# Patient Record
Sex: MALE | Race: WHITE | HISPANIC OR LATINO
[De-identification: names, ages, dates, MRNs, and addresses within clinical notes are randomized per-mention and may not be internally consistent; named-entity substitution may affect disease eponyms.]

---

## 2024-03-08 ENCOUNTER — APPOINTMENT (OUTPATIENT)
Dept: ORTHOPEDIC SURGERY | Facility: CLINIC | Age: 59
End: 2024-03-08
Payer: COMMERCIAL

## 2024-03-08 DIAGNOSIS — S83.281A OTHER TEAR OF LATERAL MENISCUS, CURRENT INJURY, RIGHT KNEE, INITIAL ENCOUNTER: ICD-10-CM

## 2024-03-08 DIAGNOSIS — Z78.9 OTHER SPECIFIED HEALTH STATUS: ICD-10-CM

## 2024-03-08 PROCEDURE — 20610 DRAIN/INJ JOINT/BURSA W/O US: CPT | Mod: 50

## 2024-03-08 PROCEDURE — 99203 OFFICE O/P NEW LOW 30 MIN: CPT | Mod: 25

## 2024-03-08 PROCEDURE — 73562 X-RAY EXAM OF KNEE 3: CPT | Mod: 50

## 2024-03-12 PROBLEM — Z78.9 CONSUMES ALCOHOL OCCASIONALLY: Status: ACTIVE | Noted: 2024-03-08

## 2024-03-12 PROBLEM — Z78.9 EXERCISES DAILY: Status: ACTIVE | Noted: 2024-03-08

## 2024-03-22 ENCOUNTER — NON-APPOINTMENT (OUTPATIENT)
Age: 59
End: 2024-03-22

## 2024-03-29 ENCOUNTER — APPOINTMENT (OUTPATIENT)
Dept: FAMILY MEDICINE | Facility: CLINIC | Age: 59
End: 2024-03-29
Payer: COMMERCIAL

## 2024-03-29 ENCOUNTER — NON-APPOINTMENT (OUTPATIENT)
Age: 59
End: 2024-03-29

## 2024-03-29 VITALS
WEIGHT: 166 LBS | DIASTOLIC BLOOD PRESSURE: 87 MMHG | TEMPERATURE: 97.3 F | HEIGHT: 69.3 IN | SYSTOLIC BLOOD PRESSURE: 149 MMHG | HEART RATE: 63 BPM | BODY MASS INDEX: 24.31 KG/M2 | OXYGEN SATURATION: 98 %

## 2024-03-29 VITALS — DIASTOLIC BLOOD PRESSURE: 80 MMHG | SYSTOLIC BLOOD PRESSURE: 130 MMHG

## 2024-03-29 DIAGNOSIS — Z00.00 ENCOUNTER FOR GENERAL ADULT MEDICAL EXAMINATION W/OUT ABNORMAL FINDINGS: ICD-10-CM

## 2024-03-29 DIAGNOSIS — R79.89 OTHER SPECIFIED ABNORMAL FINDINGS OF BLOOD CHEMISTRY: ICD-10-CM

## 2024-03-29 DIAGNOSIS — G47.00 INSOMNIA, UNSPECIFIED: ICD-10-CM

## 2024-03-29 PROCEDURE — 99386 PREV VISIT NEW AGE 40-64: CPT | Mod: 25

## 2024-03-29 PROCEDURE — 93000 ELECTROCARDIOGRAM COMPLETE: CPT

## 2024-03-29 PROCEDURE — 36415 COLL VENOUS BLD VENIPUNCTURE: CPT

## 2024-03-29 RX ORDER — ZOLPIDEM TARTRATE 5 MG/1
5 TABLET ORAL
Qty: 30 | Refills: 0 | Status: ACTIVE | COMMUNITY
Start: 2024-03-29 | End: 1900-01-01

## 2024-03-29 NOTE — HISTORY OF PRESENT ILLNESS
[de-identified] : 57 yo male presents to establish care, CPE. Patient reports hx low B12 level, has been getting monthly B12 injections but stopped last few months. Patient also reports hx insomnia, takes Ambien 5mg PRN, states he rarely uses but requesting refill. Patient recently established care with ortho Dr. Almonte for bilateral knee pain, has appt for cortisone injections scheduled for Monday. Patient states he had a colonoscopy about 4 years ago, states it was normal and he was told to repeat in 8 years, due 2028.  Patient feeling well today. [FreeTextEntry1] : Establish care, CPE

## 2024-03-29 NOTE — HEALTH RISK ASSESSMENT
[Yes] : Yes [2 - 4 times a month (2 pts)] : 2-4 times a month (2 points) [Never (0 pts)] : Never (0 points) [1 or 2 (0 pts)] : 1 or 2 (0 points) [No] : In the past 12 months have you used drugs other than those required for medical reasons? No [No falls in past year] : Patient reported no falls in the past year [0] : 2) Feeling down, depressed, or hopeless: Not at all (0) [PHQ-2 Negative - No further assessment needed] : PHQ-2 Negative - No further assessment needed [Patient reported colonoscopy was normal] : Patient reported colonoscopy was normal [None] : None [Alone] : lives alone [Employed] : employed [Fully functional (bathing, dressing, toileting, transferring, walking, feeding)] : Fully functional (bathing, dressing, toileting, transferring, walking, feeding) [Fully functional (using the telephone, shopping, preparing meals, housekeeping, doing laundry, using] : Fully functional and needs no help or supervision to perform IADLs (using the telephone, shopping, preparing meals, housekeeping, doing laundry, using transportation, managing medications and managing finances) [Never] : Never [HIV test declined] : HIV test declined [Hepatitis C test declined] : Hepatitis C test declined [de-identified] : running daily [Audit-CScore] : 2 [de-identified] : vegetarian  [[id number]] : 0 [Reports changes in hearing] : Reports no changes in hearing [Change in mental status noted] : No change in mental status noted [Reports changes in dental health] : Reports no changes in dental health [Reports changes in vision] : Reports no changes in vision [ColonoscopyDate] : 01/20 [FreeTextEntry2] :  [de-identified] : tinnitus

## 2024-03-29 NOTE — PLAN
[FreeTextEntry1] : HCM -F/u bloodwork, call patient with results -Discussed PSA screening with patient, patient agreeable -Discussed EKG with patient, shows prolonged KY interval but otherwise NSR, no acute ST/T wave changes, patient denies any dizziness, lightheadedness, SOB, CP. Patient is very active and runs daily without symptoms, will monitor for now. Patient to return for further workup if above symptoms develop. Patient verbalizes understanding, agreeable with plan.  -Up to date with colonoscopy -Patient states he has received 2 doses of shingles vaccine in past, patient to forward vaccine records for our review -Declines STI screening today

## 2024-04-01 ENCOUNTER — APPOINTMENT (OUTPATIENT)
Dept: ORTHOPEDIC SURGERY | Facility: CLINIC | Age: 59
End: 2024-04-01
Payer: COMMERCIAL

## 2024-04-01 DIAGNOSIS — M17.0 BILATERAL PRIMARY OSTEOARTHRITIS OF KNEE: ICD-10-CM

## 2024-04-01 LAB
ALBUMIN SERPL ELPH-MCNC: 4.9 G/DL
ALP BLD-CCNC: 102 U/L
ALT SERPL-CCNC: 20 U/L
ANION GAP SERPL CALC-SCNC: 12 MMOL/L
AST SERPL-CCNC: 26 U/L
BASOPHILS # BLD AUTO: 0.04 K/UL
BASOPHILS NFR BLD AUTO: 0.9 %
BILIRUB SERPL-MCNC: 0.3 MG/DL
BUN SERPL-MCNC: 17 MG/DL
CALCIUM SERPL-MCNC: 9.5 MG/DL
CHLORIDE SERPL-SCNC: 104 MMOL/L
CHOLEST SERPL-MCNC: 233 MG/DL
CO2 SERPL-SCNC: 27 MMOL/L
CREAT SERPL-MCNC: 0.7 MG/DL
EGFR: 107 ML/MIN/1.73M2
EOSINOPHIL # BLD AUTO: 0.15 K/UL
EOSINOPHIL NFR BLD AUTO: 3.3 %
ESTIMATED AVERAGE GLUCOSE: 97 MG/DL
FOLATE SERPL-MCNC: 9.9 NG/ML
GLUCOSE SERPL-MCNC: 86 MG/DL
HBA1C MFR BLD HPLC: 5 %
HCT VFR BLD CALC: 40 %
HDLC SERPL-MCNC: 95 MG/DL
HGB BLD-MCNC: 13.8 G/DL
IMM GRANULOCYTES NFR BLD AUTO: 0.2 %
LDLC SERPL CALC-MCNC: 125 MG/DL
LYMPHOCYTES # BLD AUTO: 1.76 K/UL
LYMPHOCYTES NFR BLD AUTO: 38.3 %
MAN DIFF?: NORMAL
MCHC RBC-ENTMCNC: 34.2 PG
MCHC RBC-ENTMCNC: 34.5 GM/DL
MCV RBC AUTO: 99.3 FL
MONOCYTES # BLD AUTO: 0.53 K/UL
MONOCYTES NFR BLD AUTO: 11.5 %
NEUTROPHILS # BLD AUTO: 2.11 K/UL
NEUTROPHILS NFR BLD AUTO: 45.8 %
NONHDLC SERPL-MCNC: 138 MG/DL
PLATELET # BLD AUTO: 258 K/UL
POTASSIUM SERPL-SCNC: 4.6 MMOL/L
PROT SERPL-MCNC: 7.1 G/DL
PSA SERPL-MCNC: 0.43 NG/ML
RBC # BLD: 4.03 M/UL
RBC # FLD: 13.3 %
SODIUM SERPL-SCNC: 142 MMOL/L
TRIGL SERPL-MCNC: 72 MG/DL
VIT B12 SERPL-MCNC: >2000 PG/ML
WBC # FLD AUTO: 4.6 K/UL

## 2024-04-01 PROCEDURE — 20610 DRAIN/INJ JOINT/BURSA W/O US: CPT | Mod: 50

## 2024-05-23 ENCOUNTER — NON-APPOINTMENT (OUTPATIENT)
Age: 59
End: 2024-05-23

## 2024-05-24 DIAGNOSIS — M84.30XA STRESS FRACTURE, UNSPECIFIED SITE, INITIAL ENCOUNTER FOR FRACTURE: ICD-10-CM

## 2024-05-30 ENCOUNTER — APPOINTMENT (OUTPATIENT)
Dept: MRI IMAGING | Facility: CLINIC | Age: 59
End: 2024-05-30
Payer: COMMERCIAL

## 2024-05-30 PROCEDURE — 73721 MRI JNT OF LWR EXTRE W/O DYE: CPT | Mod: 26,RT

## 2024-06-03 ENCOUNTER — OUTPATIENT (OUTPATIENT)
Dept: OUTPATIENT SERVICES | Facility: HOSPITAL | Age: 59
LOS: 1 days | End: 2024-06-03
Payer: COMMERCIAL

## 2024-06-03 ENCOUNTER — NON-APPOINTMENT (OUTPATIENT)
Age: 59
End: 2024-06-03

## 2024-06-03 ENCOUNTER — APPOINTMENT (OUTPATIENT)
Dept: ORTHOPEDIC SURGERY | Facility: CLINIC | Age: 59
End: 2024-06-03
Payer: COMMERCIAL

## 2024-06-03 VITALS
OXYGEN SATURATION: 99 % | WEIGHT: 158 LBS | HEIGHT: 71 IN | DIASTOLIC BLOOD PRESSURE: 101 MMHG | SYSTOLIC BLOOD PRESSURE: 190 MMHG | BODY MASS INDEX: 22.12 KG/M2 | HEART RATE: 59 BPM

## 2024-06-03 DIAGNOSIS — M76.52 PATELLAR TENDINITIS, LEFT KNEE: ICD-10-CM

## 2024-06-03 DIAGNOSIS — G89.29 PAIN IN RIGHT KNEE: ICD-10-CM

## 2024-06-03 DIAGNOSIS — M25.561 PAIN IN RIGHT KNEE: ICD-10-CM

## 2024-06-03 DIAGNOSIS — M25.562 PAIN IN LEFT KNEE: ICD-10-CM

## 2024-06-03 DIAGNOSIS — M17.11 UNILATERAL PRIMARY OSTEOARTHRITIS, RIGHT KNEE: ICD-10-CM

## 2024-06-03 DIAGNOSIS — Z01.812 ENCOUNTER FOR PREPROCEDURAL LABORATORY EXAMINATION: ICD-10-CM

## 2024-06-03 PROCEDURE — 77073 BONE LENGTH STUDIES: CPT

## 2024-06-03 PROCEDURE — 73564 X-RAY EXAM KNEE 4 OR MORE: CPT

## 2024-06-03 PROCEDURE — 73562 X-RAY EXAM OF KNEE 3: CPT | Mod: 26,50,59

## 2024-06-03 PROCEDURE — 77073 BONE LENGTH STUDIES: CPT | Mod: 26

## 2024-06-03 PROCEDURE — G2212 PROLONG OUTPT/OFFICE VIS: CPT

## 2024-06-03 PROCEDURE — 99205 OFFICE O/P NEW HI 60 MIN: CPT

## 2024-06-03 NOTE — CONSULT LETTER
[Dear  ___] : Dear  [unfilled], [Consult Letter:] : I had the pleasure of evaluating your patient, [unfilled]. [Please see my note below.] : Please see my note below. [Consult Closing:] : Thank you very much for allowing me to participate in the care of this patient.  If you have any questions, please do not hesitate to contact me. [Sincerely,] : Sincerely, [FreeTextEntry3] : Deven Marie Jr. MD [DrAllan  ___] : Dr. AGUILA

## 2024-06-03 NOTE — PHYSICAL EXAM
[de-identified] : Right Knee/Lower Extremity:   Skin: Normal. No erythema, no ecchymosis, no abrasions, no scratches, lots of tattoos.                    Incisions:  Healed arthroscopy portals.   Swelling Knee Joint :  Positive effusion: Mild    Swelling Popliteal Swelling: None.    Swelling Pre- Patella Bursa:  None.   Alignment is Valgus: Mild   Alignment is:  Passively correctable to near neutral.    Fixed Flexion Contracture. None.    Range of Motion Extension: 0 degrees.    Range of Motion Flexion: 125 degrees.     Medial Collateral Ligament Laxity: Good endpoint.  Lateral Collateral Ligament Laxity: Normal laxity. Good endpoint. Lateral opening to varus stress.  Mild   Knee Joint Line Tenderness:  Tender at lateral joint line.  Tender at medial joint line.   Not tender in the patellofemoral compartment.   Soft Tissue Tenderness: None.  Not tender at the Pes Anserine Bursa, IT Band, Patella tendon, Quad tendon.   ACL Testing: Stable ACL. Good Endpoint.  Lachman Test: Negative.  Anterior Drawer Test: Negative  PCL Testing: Stable PCL.  Good Endpoint. Posterior Drawer Test: Negative   Patellofemoral Crepitus:  Positive crepitus in the patellofemoral compartment.   Patella Compression Test: minimal pain  Patellofemoral Apprehension Testing: Negative.    Patellofemoral Laxity: Normal.   Motor Strength: 			 Quadriceps strength is 5 out of 5                                                                 Hamstring strength is 5 out of 5                                                                Ankle dorsiflexion strength is 5 out of 5                                                               Ankle plantarflexion strength is 5 out of 5   Sensation: Light tough sensation in the lower extremity is grossly normal.                                                             Pulses: 				 Pulses are palpable at the ankle at the Dorsalis Pedis Artery.                                                                Pulses are palpable at the Posterior Tibialis Artery.                                                                 Hip Motion: The patient has painless hip range of motion.                                                          Lumbar Spine Symptoms:  Negative straight leg raise.    Gait: Limping Gait.  Abnormal gait.    Assistive Devices: 	None   [de-identified] : Right Knee:  -MRI Findings show full thickness lateral patella facet cartilage loss, full thickness lateral tibial plateau cartilage loss, bone edema in the lateral tibial plateau, Osteophytes on the lateral femoral condyle, complex tearing of the lateral meniscus body and posterior horn.  -Xray Findings show moderate lateral joint space loss on the flexion weight bearing views, osteophytes in the lateral compartment on the femur, chondrocalcinosis of the lateral meniscus, Mild valgus alignment of the right knee, mild patella lateral subluxation of the right knee with decreased lateral facet joint space and small PF osteophytes.   Left Knee: No significant joint space narrowing in the medial, lateral, or patellofemoral joint spaces. Centrally tracking patella

## 2024-06-03 NOTE — DISCUSSION/SUMMARY
[de-identified] : Assessment: 58 year old male with chronic right knee pain worsening and no longer responsive to non-surgical treatments.  -Diagnosis: osteoarthritis of the right knee moderate to severe in the lateral and patellofemoral compartment with lateral meniscus tearing.  -MRI Findings show full thickness lateral patella facet cartilage loss, full thickness lateral tibial plateau cartilage loss, bone edema in the lateral tibial plateau, Osteophytes on the lateral femoral condyle, complex tearing of the lateral meniscus body and posterior horn.  -Xray Findings show moderate lateral joint space loss on the flexion weight bearing views, osteophytes in the lateral compartment on the femur, chondrocalcinosis of the lateral meniscus, Mild valgus alignment of the right knee, mild patella lateral subluxation of the right knee with decreased lateral facet joint space and small PF osteophytes.  Plan:  - The patient is an Excellent candidate for a right Total Knee Replacement and highly motivated to get back to an active lifestyle. - He has tried and failed non-surgical therapies including injections of HA and cortisone, and surgical arthroscopy of the knee. - I had a discussion with the patient about the option, risks, and benefits of Total knee replacement for their knee condition. - The patient expressed understanding of the diagnosis and the options. - The patient would like to proceed soon with a Total Knee Replacement.  - The procedure would use the [employer] Robotic-Arm Total Knee System. - The implants used are the Triathlon knee system from [employer]. - The materials are Titanium Alloy tibial implants, CoCr Alloy femoral implants, Polyethylene tibial bearings. - Cement may or may not be used for fixation based on the patient's intraoperative bone quality.  - The patient was given the opportunity to discuss surgery scheduling with our coordinator. - Our Information packet on our total knee replacement process was given to the patient to take home and review. - The patient was given the opportunity to schedule their procedure today. - We will assist the patient with all the preoperative scheduling and planning requirements if they choose to undergo the procedure. - If the patient schedules their procedure, a preoperative appointment will be scheduled prior to surgery.  - A preoperative visit prior to surgery would be to review the procedure, review the medical clearance(s), and answer any questions the patient may have.   Discussion Total Knee Replacement:  - I had a discussion with the patient regarding the findings of their history, exam and imaging. We discussed the option of Total Knee Replacement using the precision Chaitanya Robotic-Arm System and implants from [employer]. We discussed the indications, surgical process, my techniques of surgery, the probable post-operative course and instructions, and the risks and benefits of the procedure.   - Although there are no guarantees to success, I feel that the surgery would be beneficial and there is an excellent chance for a positive outcome.  - The implants used are the Triathlon knee system from [employer]. The materials are Titanium tibial implants, CoCr Alloy femoral implants, Polyethylene tibial bearings.  - The risks of this procedure include but are not limited to the risks of anesthesia, heart attack, stroke, respiratory complications, wound healing problems, skin blisters, delayed wound healing, infection, bleeding, nerve damage, vessel damage, skin sensory changes next to the incisions, loss of motion, scar tissue formation requiring further treatment, blood clots, heterotopic bone formation, implant wear, implant loosening after surgery, allergic responses to the implant materials, continued pain despite proper implant placement, soft tissue pain, possible recurrent knee swelling, and the possibility for further surgery in the future.  - The patient had their questions answered to their satisfaction. The patient understood our discussion that was aided by the use of knee models and implants, and a review of their imaging on the computer screen.   - I look forward to the opportunity to help this patient with their painful knee condition.

## 2024-06-03 NOTE — HISTORY OF PRESENT ILLNESS
[de-identified] : Nessa is a 58 year old male who presents today with right knee pain. He states the pain started years ago. He is a avid runner. He describes mostly medial pain, but also knee cap pain that radiates into calf muscles, lower back and hip joint. He has tried rest, ice, NSAIDs, Tylenol, PT, home exercise, cortisone and HA injections in the past to help with pain. He had a knee arthroscopy done 1 year ago. He was referred by Dr. Almonte. He recently had a CSI in March and then a HA injection in April that gave him relief for about 1 month. He is also having medial left knee pain as well.  I have reviewed his mri with him and gone over the xrays during our consultation.  His left knee is hurting only anteriorly along the patella tendon and not on the joint line or in the patellofemoral compartment.  [ Right Knee  ]  Onset of Knee Symptoms: years ago  Knee Symptoms: Pain with Activity, Walking, at Rest, Loss of Motion, Decrease Walk Distance, Weakness, Giving Way and Painful Clicking  Location of Pain: Inner Side of Knee, Back of Knee, Front of Knee, Behind Kneecap, In Calf Muscles, Low Back Pain and Hip Pain  Duration of Pain: Daily  Intensity of Pain: Pain Level: moderate 6  Character of Pain: Aching  Painful Activities: Walking, Walking after Sitting, Running, Turning / Twisting, Squatting, Lunges, Rising From a Seat, Getting On / Off a Toilet Stairs Going: down  Knee History: History of Osteoarthritis, Meniscus tear  Non-Surgical Knee Treatments: Rest, Ice, NSAIDs, Acetaminophen, Physical Therapy, Home Exercise Program, Cortisone Joint Injection and HA Injections  Surgical Knee History: right knee Meniscectomy 2023

## 2024-06-04 ENCOUNTER — APPOINTMENT (OUTPATIENT)
Dept: FAMILY MEDICINE | Facility: CLINIC | Age: 59
End: 2024-06-04

## 2024-06-04 LAB
25(OH)D3 SERPL-MCNC: 30.2 NG/ML
ALBUMIN SERPL ELPH-MCNC: 5.3 G/DL
ALP BLD-CCNC: 106 U/L
ALT SERPL-CCNC: 20 U/L
ANION GAP SERPL CALC-SCNC: 15 MMOL/L
APPEARANCE: CLEAR
APTT BLD: 29.3 SEC
AST SERPL-CCNC: 30 U/L
BACTERIA: NEGATIVE /HPF
BASOPHILS # BLD AUTO: 0.04 K/UL
BASOPHILS NFR BLD AUTO: 0.6 %
BILIRUB SERPL-MCNC: 0.4 MG/DL
BILIRUBIN URINE: NEGATIVE
BLOOD URINE: NEGATIVE
BUN SERPL-MCNC: 21 MG/DL
CALCIUM SERPL-MCNC: 10.3 MG/DL
CAST: 0 /LPF
CHLORIDE SERPL-SCNC: 101 MMOL/L
CO2 SERPL-SCNC: 25 MMOL/L
COLOR: YELLOW
CREAT SERPL-MCNC: 0.87 MG/DL
EGFR: 100 ML/MIN/1.73M2
EOSINOPHIL # BLD AUTO: 0.07 K/UL
EOSINOPHIL NFR BLD AUTO: 1.1 %
EPITHELIAL CELLS: 2 /HPF
ESTIMATED AVERAGE GLUCOSE: 100 MG/DL
FERRITIN SERPL-MCNC: 126 NG/ML
GLUCOSE QUALITATIVE U: NEGATIVE MG/DL
GLUCOSE SERPL-MCNC: 93 MG/DL
HBA1C MFR BLD HPLC: 5.1 %
HCT VFR BLD CALC: 42.7 %
HGB BLD-MCNC: 14.6 G/DL
IMM GRANULOCYTES NFR BLD AUTO: 0.2 %
INR PPP: 0.97 RATIO
IRON SATN MFR SERPL: 33 %
IRON SERPL-MCNC: 142 UG/DL
KETONES URINE: ABNORMAL MG/DL
LEUKOCYTE ESTERASE URINE: NEGATIVE
LYMPHOCYTES # BLD AUTO: 1.89 K/UL
LYMPHOCYTES NFR BLD AUTO: 29.9 %
MAN DIFF?: NORMAL
MCHC RBC-ENTMCNC: 34.2 GM/DL
MCHC RBC-ENTMCNC: 34.2 PG
MCV RBC AUTO: 100 FL
MICROSCOPIC-UA: NORMAL
MONOCYTES # BLD AUTO: 0.57 K/UL
MONOCYTES NFR BLD AUTO: 9 %
NEUTROPHILS # BLD AUTO: 3.74 K/UL
NEUTROPHILS NFR BLD AUTO: 59.2 %
NITRITE URINE: NEGATIVE
PH URINE: 6
PLATELET # BLD AUTO: 279 K/UL
POTASSIUM SERPL-SCNC: 4.7 MMOL/L
PROT SERPL-MCNC: 7.6 G/DL
PROTEIN URINE: NORMAL MG/DL
PT BLD: 11 SEC
RBC # BLD: 4.27 M/UL
RBC # FLD: 12.8 %
RED BLOOD CELLS URINE: 2 /HPF
SODIUM SERPL-SCNC: 141 MMOL/L
SPECIFIC GRAVITY URINE: 1.03
TIBC SERPL-MCNC: 430 UG/DL
TRANSFERRIN SERPL-MCNC: 351 MG/DL
UIBC SERPL-MCNC: 287 UG/DL
UROBILINOGEN URINE: 0.2 MG/DL
WBC # FLD AUTO: 6.32 K/UL
WHITE BLOOD CELLS URINE: 2 /HPF

## 2024-06-05 LAB — BACTERIA UR CULT: NORMAL

## 2024-06-06 ENCOUNTER — APPOINTMENT (OUTPATIENT)
Dept: INTERNAL MEDICINE | Facility: CLINIC | Age: 59
End: 2024-06-06
Payer: COMMERCIAL

## 2024-06-06 ENCOUNTER — NON-APPOINTMENT (OUTPATIENT)
Age: 59
End: 2024-06-06

## 2024-06-06 VITALS
BODY MASS INDEX: 22.12 KG/M2 | WEIGHT: 158 LBS | HEART RATE: 55 BPM | RESPIRATION RATE: 16 BRPM | SYSTOLIC BLOOD PRESSURE: 148 MMHG | DIASTOLIC BLOOD PRESSURE: 88 MMHG | HEIGHT: 71 IN | TEMPERATURE: 99.1 F | OXYGEN SATURATION: 99 %

## 2024-06-06 DIAGNOSIS — Z01.818 ENCOUNTER FOR OTHER PREPROCEDURAL EXAMINATION: ICD-10-CM

## 2024-06-06 DIAGNOSIS — I44.0 ATRIOVENTRICULAR BLOCK, FIRST DEGREE: ICD-10-CM

## 2024-06-06 PROCEDURE — 99214 OFFICE O/P EST MOD 30 MIN: CPT | Mod: 25

## 2024-06-06 PROCEDURE — 93000 ELECTROCARDIOGRAM COMPLETE: CPT

## 2024-06-06 NOTE — HISTORY OF PRESENT ILLNESS
[No Pertinent Cardiac History] : no history of aortic stenosis, atrial fibrillation, coronary artery disease, recent myocardial infarction, or implantable device/pacemaker [No Pertinent Pulmonary History] : no history of asthma, COPD, sleep apnea, or smoking [No Adverse Anesthesia Reaction] : no adverse anesthesia reaction in self or family member [(Patient denies any chest pain, claudication, dyspnea on exertion, orthopnea, palpitations or syncope)] : Patient denies any chest pain, claudication, dyspnea on exertion, orthopnea, palpitations or syncope [Excellent (>10 METs)] : Excellent (>10 METs) [Chronic Anticoagulation] : no chronic anticoagulation [Chronic Kidney Disease] : no chronic kidney disease [Diabetes] : no diabetes [FreeTextEntry1] : NEVA SIFUENTES  [FreeTextEntry2] : 6/25/24 [FreeTextEntry3] : Dr. Deven Marie [FreeTextEntry4] : 57 y/o M with PMHx R knee OA/patellar tendinitis who presents for preoperative clearance for R TKR.  He feels well today other than ongoing R knee pain and has no complaints.

## 2024-06-06 NOTE — ASSESSMENT
[Patient Optimized for Surgery] : Patient optimized for surgery [No Further Testing Recommended] : no further testing recommended [As per surgery] : as per surgery [FreeTextEntry4] : Patient is low risk for low risk procedure  Bloodwork done in surgeon's office - reviewed, unremarkable, including urine sample.  EKG done on 3/29/24 with his PCP Dr. Sheth - showed mild 1st degree AV block. Patient has no recent syncopal episodes/dyspnea that would necessitate cardiology evaluation, so he is safe to proceed with surgery as scheduled. Does not need cardiac clearance. Repeat EKG done today showing persistent 1st degree AVB. Again, as he is not symptomatic, does not need cardiac clearance and can proceed with surgery as scheduled.  Advised him to hold his vitamins (Vitamin D and C 1 week prior to surgery).

## 2024-06-10 ENCOUNTER — NON-APPOINTMENT (OUTPATIENT)
Age: 59
End: 2024-06-10

## 2024-06-19 ENCOUNTER — OUTPATIENT (OUTPATIENT)
Dept: OUTPATIENT SERVICES | Facility: HOSPITAL | Age: 59
LOS: 1 days | End: 2024-06-19

## 2024-06-19 ENCOUNTER — APPOINTMENT (OUTPATIENT)
Dept: ORTHOPEDIC SURGERY | Facility: CLINIC | Age: 59
End: 2024-06-19
Payer: COMMERCIAL

## 2024-06-19 ENCOUNTER — APPOINTMENT (OUTPATIENT)
Dept: CT IMAGING | Facility: CLINIC | Age: 59
End: 2024-06-19
Payer: COMMERCIAL

## 2024-06-19 VITALS
OXYGEN SATURATION: 98 % | DIASTOLIC BLOOD PRESSURE: 92 MMHG | HEIGHT: 71 IN | HEART RATE: 72 BPM | BODY MASS INDEX: 22.12 KG/M2 | SYSTOLIC BLOOD PRESSURE: 148 MMHG | WEIGHT: 158 LBS

## 2024-06-19 DIAGNOSIS — M17.11 UNILATERAL PRIMARY OSTEOARTHRITIS, RIGHT KNEE: ICD-10-CM

## 2024-06-19 DIAGNOSIS — M25.561 PAIN IN RIGHT KNEE: ICD-10-CM

## 2024-06-19 DIAGNOSIS — G89.29 PAIN IN RIGHT KNEE: ICD-10-CM

## 2024-06-19 PROCEDURE — 99214 OFFICE O/P EST MOD 30 MIN: CPT

## 2024-06-19 PROCEDURE — 73700 CT LOWER EXTREMITY W/O DYE: CPT | Mod: 26,RT

## 2024-06-19 RX ORDER — CELECOXIB 200 MG/1
200 CAPSULE ORAL
Qty: 60 | Refills: 2 | Status: ACTIVE | COMMUNITY
Start: 2024-06-19 | End: 1900-01-01

## 2024-06-19 RX ORDER — HYDROCODONE BITARTRATE AND ACETAMINOPHEN 7.5; 325 MG/1; MG/1
7.5-325 TABLET ORAL
Qty: 24 | Refills: 0 | Status: ACTIVE | COMMUNITY
Start: 2024-06-19 | End: 1900-01-01

## 2024-06-19 RX ORDER — CEFADROXIL 500 MG/1
500 CAPSULE ORAL TWICE DAILY
Qty: 14 | Refills: 1 | Status: ACTIVE | COMMUNITY
Start: 2024-06-19 | End: 1900-01-01

## 2024-06-19 NOTE — PHYSICAL EXAM
[de-identified] : Right Knee/Lower Extremity:   Skin: Normal. No erythema, no ecchymosis, no abrasions, no scratches, lots of tattoos.                    Incisions:  Healed arthroscopy portals.   Swelling Knee Joint :  Positive effusion: Mild    Swelling Popliteal Swelling: None.    Swelling Pre- Patella Bursa:  None.   Alignment is Valgus: Mild   Alignment is:  Passively correctable to near neutral.    Fixed Flexion Contracture. None.    Range of Motion Extension: 0 degrees.    Range of Motion Flexion: 125 degrees.     Medial Collateral Ligament Laxity: Good endpoint.  Lateral Collateral Ligament Laxity: Normal laxity. Good endpoint. Lateral opening to varus stress.  Mild   Knee Joint Line Tenderness:  Tender at lateral joint line.  Tender at medial joint line.   Not tender in the patellofemoral compartment.   Soft Tissue Tenderness: None.  Not tender at the Pes Anserine Bursa, IT Band, Patella tendon, Quad tendon.   ACL Testing: Stable ACL. Good Endpoint.  Lachman Test: Negative.  Anterior Drawer Test: Negative  PCL Testing: Stable PCL.  Good Endpoint. Posterior Drawer Test: Negative   Patellofemoral Crepitus:  Positive crepitus in the patellofemoral compartment.   Patella Compression Test: minimal pain  Patellofemoral Apprehension Testing: Negative.    Patellofemoral Laxity: Normal.   Motor Strength: 			 Quadriceps strength is 5 out of 5                                                                 Hamstring strength is 5 out of 5                                                                Ankle dorsiflexion strength is 5 out of 5                                                               Ankle plantarflexion strength is 5 out of 5   Sensation: Light tough sensation in the lower extremity is grossly normal.                                                             Pulses: 				 Pulses are palpable at the ankle at the Dorsalis Pedis Artery.                                                                Pulses are palpable at the Posterior Tibialis Artery.                                                                 Hip Motion: The patient has painless hip range of motion.                                                          Lumbar Spine Symptoms:  Negative straight leg raise.    Gait: Limping Gait.  Abnormal gait.    Assistive Devices: 	None   [de-identified] : Right Knee:  -MRI Findings show full thickness lateral patella facet cartilage loss, full thickness lateral tibial plateau cartilage loss, bone edema in the lateral tibial plateau, Osteophytes on the lateral femoral condyle, complex tearing of the lateral meniscus body and posterior horn.  -Xray Findings show moderate lateral joint space loss on the flexion weight bearing views, osteophytes in the lateral compartment on the femur, chondrocalcinosis of the lateral meniscus, Mild valgus alignment of the right knee, mild patella lateral subluxation of the right knee with decreased lateral facet joint space and small PF osteophytes.   Left Knee: No significant joint space narrowing in the medial, lateral, or patellofemoral joint spaces. Centrally tracking patella

## 2024-06-19 NOTE — HISTORY OF PRESENT ILLNESS
[de-identified] : Nessa is a 58 year old male who presents today for a pre operative visit for a right TKA on 6.25.24 at .  Chief Complaint / Diagnosis: Knee Pain / Osteoarthritis Planned Procedure: Total Knee Replacement, Right Knee.   Summary of Pre-operative Consultation:   - The patient is a 58-year-old with right knee pain from osteoarthritis.  - The patient"s pain is worsening and impacting function and quality of life. - Non-surgical treatments and non-arthroplasty procedures have not provided sufficient pain or symptom relief from the knee arthritis. - The planned procedure the patient was seen here for today was a total knee replacement.  (Chaitanya Robotic Arm Assisted). - The procedure will be performed at [facility]. - The procedure will be on Tuesday 6.25.2024. - The procedure will be performed as an outpatient. - The medical clearance was completed - The patient has completed all the steps to prepare for the procedure. - The patient has been cleared for surgery. - We reviewed the knee imaging today and confirmed the physical exam findings. - We discussed procedure details including the benefits and risks and the post-operative process.

## 2024-06-19 NOTE — DISCUSSION/SUMMARY
[de-identified] : Assessment:   -Knee Pain, Right Side -Osteoarthritis of the Right Knee.   -X-rays show advance osteoarthritis with moderate to severe joint space narrowing. -Imaging reviewed with patient today -Pain with weightbearing activity -Failure of conservative management.   Plan: -Procedure: Total Knee Replacement, Right Knee. -Procedure date: 6.25.2024 -Procedure Location: [facility] in [facility]. -Cleared for surgery, clearance and labs reviewed. -CT for preoperative and intraoperative Chaitanya planning completed today -NPO midnight before surgery. -Follow-up: post op day one at our office at [facility] 6th Floor.     Discussion Total Knee Replacement:   - I had a discussion with the patient regarding the findings of their history, exam and imaging. We discussed the option of Total Knee Replacement using the precision Chaitanya Robotic-Arm System and implants from [employer]. We discussed the indications, surgical process, my techniques of surgery, the probable post-operative course and instructions, and the risks and benefits of the procedure.   - Although there are no guarantees to success, I feel that the surgery would be beneficial and there is an excellent chance for a positive outcome.   - The implants used are the Triathlon knee system from [employer]. The materials are Titanium tibial implants, CoCr Alloy femoral implants, Polyethylene tibial bearings.   - The risks of this procedure include but are not limited to the risks of anesthesia, heart attack, stroke, respiratory complications, wound healing problems, skin blisters, delayed wound healing, infection, bleeding, nerve damage, vessel damage, skin sensory changes next to the incisions, loss of motion, scar tissue formation requiring further treatment, blood clots, heterotopic bone formation, implant wear, implant loosening after surgery, allergic responses to the implant materials, continued pain despite proper implant placement, soft tissue pain, possible recurrent knee swelling, and the possibility for further surgery in the future.   - The patient had their questions answered to their satisfaction. - The patient understood our discussion that was aided by the use of knee models and implants, and a review of their imaging on the computer screen. - The patient expressed understanding and agreement with the plan.   - I look forward to the opportunity to help this patient with their painful knee condition.

## 2024-06-20 LAB
MRSA SPEC QL CULT: NOT DETECTED
STAPH AUREUS (SA): NOT DETECTED

## 2024-06-25 ENCOUNTER — APPOINTMENT (OUTPATIENT)
Age: 59
End: 2024-06-25
Payer: COMMERCIAL

## 2024-06-25 PROCEDURE — 27447 TOTAL KNEE ARTHROPLASTY: CPT | Mod: RT

## 2024-06-26 ENCOUNTER — OUTPATIENT (OUTPATIENT)
Dept: OUTPATIENT SERVICES | Facility: HOSPITAL | Age: 59
LOS: 1 days | End: 2024-06-26
Payer: COMMERCIAL

## 2024-06-26 ENCOUNTER — APPOINTMENT (OUTPATIENT)
Dept: ORTHOPEDIC SURGERY | Facility: CLINIC | Age: 59
End: 2024-06-26
Payer: COMMERCIAL

## 2024-06-26 VITALS
DIASTOLIC BLOOD PRESSURE: 85 MMHG | SYSTOLIC BLOOD PRESSURE: 149 MMHG | HEIGHT: 71 IN | OXYGEN SATURATION: 99 % | BODY MASS INDEX: 22.12 KG/M2 | HEART RATE: 56 BPM | WEIGHT: 158 LBS

## 2024-06-26 DIAGNOSIS — Z96.651 AFTERCARE FOLLOWING JOINT REPLACEMENT SURGERY: ICD-10-CM

## 2024-06-26 DIAGNOSIS — Z96.651 PRESENCE OF RIGHT ARTIFICIAL KNEE JOINT: ICD-10-CM

## 2024-06-26 DIAGNOSIS — Z47.1 AFTERCARE FOLLOWING JOINT REPLACEMENT SURGERY: ICD-10-CM

## 2024-06-26 PROCEDURE — 77073 BONE LENGTH STUDIES: CPT | Mod: 26

## 2024-06-26 PROCEDURE — 99024 POSTOP FOLLOW-UP VISIT: CPT

## 2024-06-26 PROCEDURE — 73562 X-RAY EXAM OF KNEE 3: CPT

## 2024-06-26 PROCEDURE — 77073 BONE LENGTH STUDIES: CPT

## 2024-06-26 PROCEDURE — 73562 X-RAY EXAM OF KNEE 3: CPT | Mod: 26,RT

## 2024-07-05 ENCOUNTER — NON-APPOINTMENT (OUTPATIENT)
Age: 59
End: 2024-07-05

## 2024-07-11 ENCOUNTER — APPOINTMENT (OUTPATIENT)
Dept: ORTHOPEDIC SURGERY | Facility: CLINIC | Age: 59
End: 2024-07-11
Payer: COMMERCIAL

## 2024-07-11 VITALS
HEART RATE: 58 BPM | WEIGHT: 158 LBS | BODY MASS INDEX: 22.12 KG/M2 | HEIGHT: 71 IN | DIASTOLIC BLOOD PRESSURE: 88 MMHG | SYSTOLIC BLOOD PRESSURE: 145 MMHG | OXYGEN SATURATION: 100 %

## 2024-07-11 DIAGNOSIS — Z96.651 PRESENCE OF RIGHT ARTIFICIAL KNEE JOINT: ICD-10-CM

## 2024-07-11 DIAGNOSIS — Z47.1 AFTERCARE FOLLOWING JOINT REPLACEMENT SURGERY: ICD-10-CM

## 2024-07-11 DIAGNOSIS — Z96.651 AFTERCARE FOLLOWING JOINT REPLACEMENT SURGERY: ICD-10-CM

## 2024-07-11 PROCEDURE — 99024 POSTOP FOLLOW-UP VISIT: CPT

## 2024-07-24 ENCOUNTER — APPOINTMENT (OUTPATIENT)
Dept: ORTHOPEDIC SURGERY | Facility: CLINIC | Age: 59
End: 2024-07-24
Payer: COMMERCIAL

## 2024-07-24 VITALS
OXYGEN SATURATION: 99 % | HEIGHT: 71 IN | HEART RATE: 78 BPM | DIASTOLIC BLOOD PRESSURE: 81 MMHG | WEIGHT: 158 LBS | SYSTOLIC BLOOD PRESSURE: 124 MMHG | BODY MASS INDEX: 22.12 KG/M2

## 2024-07-24 DIAGNOSIS — Z47.1 AFTERCARE FOLLOWING JOINT REPLACEMENT SURGERY: ICD-10-CM

## 2024-07-24 DIAGNOSIS — Z96.651 PRESENCE OF RIGHT ARTIFICIAL KNEE JOINT: ICD-10-CM

## 2024-07-24 DIAGNOSIS — Z96.651 AFTERCARE FOLLOWING JOINT REPLACEMENT SURGERY: ICD-10-CM

## 2024-07-24 PROCEDURE — 99024 POSTOP FOLLOW-UP VISIT: CPT

## 2024-07-24 NOTE — HISTORY OF PRESENT ILLNESS
[de-identified] : Nessa is a 58 year old male who presents today for a post op visit.   Post op Day: 4 weeks Surgery Type: Total Knee Arthroplasty Side of Surgery: Right Date of Surgery: 06.25.24   Pain Level: 2 Assistive Device: none Satisfaction Level: Very Satisfied   Activities: Walking, home exercises and outpatient PT [de-identified] : Nessa is a 58 year old male who presents today 4 weeks s/p right TKA. He states he is feeling good, a little stiff with bending.. [de-identified] : Right Knee Exam:   Incision: Healing midline incision.   Incision Pin Site: Healing tibial pin site incision.   Skin:  No erythema.  No ecchymosis.  No drainage.  Dermabond removed and a few spots began to bleed a little so I resealed the incision with new dermabond today.                  Knee Joint Swelling: No Swelling.   Alignment: Neutral.                                                          ROM Extension: 4 degrees.    ROM Flexion: 105 degrees.     Medial Collateral Ligament Laxity:  Normal laxity. Good endpoint.   Lateral Collateral Ligament Laxity: Normal laxity. Good endpoint.   Instability: No flexion or extension instability  Anterior Drawer Test: No significant translation  Posterior Drawer Test:  Stable with good endpoint   Motor Strength: 	 Quadriceps strength is 5 out of 5  Hamstring strength is 5 out of 5  Ankle dorsiflexion strength is 5 out of 5  Ankle plantarflexion strength is 5 out of 5   Sensation:   Light tough sensation in the lower extremity is grossly normal.   Sensory change is located lateral to incision as expected.   Pulses:  Pulses are palpable at the ankle at the Dorsalis Pedis Artery.   Pulses are palpable at the Posterior Tibialis Artery.                                                                   Gait: Gait improving, almost normal gait.   Assistive Devices: None.  [de-identified] : none [de-identified] : Assessment:  - Post Op 4 Weeks after Total Knee Replacement, Chaitanya Robotic Assisted - Cementless all three components. - Incision clean.  - Old Dermabond was removed and a new layer applied - Skin Edges healthy. No signs of infection. - No calf tenderness. No signs of DVT. - Pain well controlled  - Walking well. - Has been going to Physical Therapy and will continue 3 times per week. - Range of Motion improving. 4-105 - Stable collateral ligament exam. No Flexion instability. - No x-rays taken today.    Plan:  - Continue to work on stretching and strengthening of operative leg with PT 3 times a week, and daily home exercise program. - May Shower not submerge incision   - May discontinue Aspirin  - Follow-up 8 weeks after date of surgery with X-rays. Standing AP, Lateral, Merchant View, Bilateral Standing Hip to Ankle View.

## 2024-07-24 NOTE — HISTORY OF PRESENT ILLNESS
[de-identified] : Nessa is a 58 year old male who presents today for a post op visit.   Post op Day: 4 weeks Surgery Type: Total Knee Arthroplasty Side of Surgery: Right Date of Surgery: 06.25.24   Pain Level: 2 Assistive Device: none Satisfaction Level: Very Satisfied   Activities: Walking, home exercises and outpatient PT [de-identified] : Nessa is a 58 year old male who presents today 4 weeks s/p right TKA. He states he is feeling good, a little stiff with bending.. [de-identified] : Right Knee Exam:   Incision: Healing midline incision.   Incision Pin Site: Healing tibial pin site incision.   Skin:  No erythema.  No ecchymosis.  No drainage.  Dermabond removed and a few spots began to bleed a little so I resealed the incision with new dermabond today.                  Knee Joint Swelling: No Swelling.   Alignment: Neutral.                                                          ROM Extension: 4 degrees.    ROM Flexion: 105 degrees.     Medial Collateral Ligament Laxity:  Normal laxity. Good endpoint.   Lateral Collateral Ligament Laxity: Normal laxity. Good endpoint.   Instability: No flexion or extension instability  Anterior Drawer Test: No significant translation  Posterior Drawer Test:  Stable with good endpoint   Motor Strength: 	 Quadriceps strength is 5 out of 5  Hamstring strength is 5 out of 5  Ankle dorsiflexion strength is 5 out of 5  Ankle plantarflexion strength is 5 out of 5   Sensation:   Light tough sensation in the lower extremity is grossly normal.   Sensory change is located lateral to incision as expected.   Pulses:  Pulses are palpable at the ankle at the Dorsalis Pedis Artery.   Pulses are palpable at the Posterior Tibialis Artery.                                                                   Gait: Gait improving, almost normal gait.   Assistive Devices: None.  [de-identified] : none [de-identified] : Assessment:  - Post Op 4 Weeks after Total Knee Replacement, Chaitanya Robotic Assisted - Cementless all three components. - Incision clean.  - Old Dermabond was removed and a new layer applied - Skin Edges healthy. No signs of infection. - No calf tenderness. No signs of DVT. - Pain well controlled  - Walking well. - Has been going to Physical Therapy and will continue 3 times per week. - Range of Motion improving. 4-105 - Stable collateral ligament exam. No Flexion instability. - No x-rays taken today.    Plan:  - Continue to work on stretching and strengthening of operative leg with PT 3 times a week, and daily home exercise program. - May Shower not submerge incision   - May discontinue Aspirin  - Follow-up 8 weeks after date of surgery with X-rays. Standing AP, Lateral, Merchant View, Bilateral Standing Hip to Ankle View.

## 2024-07-24 NOTE — HISTORY OF PRESENT ILLNESS
[de-identified] : Nsesa is a 58 year old male who presents today for a post op visit.   Post op Day: 4 weeks Surgery Type: Total Knee Arthroplasty Side of Surgery: Right Date of Surgery: 06.25.24   Pain Level: 2 Assistive Device: none Satisfaction Level: Very Satisfied   Activities: Walking, home exercises and outpatient PT [de-identified] : Nessa is a 58 year old male who presents today 4 weeks s/p right TKA. He states he is feeling good, a little stiff with bending.. [de-identified] : Right Knee Exam:   Incision: Healing midline incision.   Incision Pin Site: Healing tibial pin site incision.   Skin:  No erythema.  No ecchymosis.  No drainage.  Dermabond removed and a few spots began to bleed a little so I resealed the incision with new dermabond today.                  Knee Joint Swelling: No Swelling.   Alignment: Neutral.                                                          ROM Extension: 4 degrees.    ROM Flexion: 105 degrees.     Medial Collateral Ligament Laxity:  Normal laxity. Good endpoint.   Lateral Collateral Ligament Laxity: Normal laxity. Good endpoint.   Instability: No flexion or extension instability  Anterior Drawer Test: No significant translation  Posterior Drawer Test:  Stable with good endpoint   Motor Strength: 	 Quadriceps strength is 5 out of 5  Hamstring strength is 5 out of 5  Ankle dorsiflexion strength is 5 out of 5  Ankle plantarflexion strength is 5 out of 5   Sensation:   Light tough sensation in the lower extremity is grossly normal.   Sensory change is located lateral to incision as expected.   Pulses:  Pulses are palpable at the ankle at the Dorsalis Pedis Artery.   Pulses are palpable at the Posterior Tibialis Artery.                                                                   Gait: Gait improving, almost normal gait.   Assistive Devices: None.  [de-identified] : none [de-identified] : Assessment:  - Post Op 4 Weeks after Total Knee Replacement, Chaitanya Robotic Assisted - Cementless all three components. - Incision clean.  - Old Dermabond was removed and a new layer applied - Skin Edges healthy. No signs of infection. - No calf tenderness. No signs of DVT. - Pain well controlled  - Walking well. - Has been going to Physical Therapy and will continue 3 times per week. - Range of Motion improving. 4-105 - Stable collateral ligament exam. No Flexion instability. - No x-rays taken today.    Plan:  - Continue to work on stretching and strengthening of operative leg with PT 3 times a week, and daily home exercise program. - May Shower not submerge incision   - May discontinue Aspirin  - Follow-up 8 weeks after date of surgery with X-rays. Standing AP, Lateral, Merchant View, Bilateral Standing Hip to Ankle View.

## 2024-07-30 ENCOUNTER — NON-APPOINTMENT (OUTPATIENT)
Age: 59
End: 2024-07-30

## 2024-08-08 ENCOUNTER — APPOINTMENT (OUTPATIENT)
Dept: FAMILY MEDICINE | Facility: CLINIC | Age: 59
End: 2024-08-08

## 2024-08-08 PROBLEM — R49.0 VOICE HOARSENESS: Status: ACTIVE | Noted: 2024-08-08

## 2024-08-08 PROCEDURE — G2211 COMPLEX E/M VISIT ADD ON: CPT

## 2024-08-08 PROCEDURE — 99213 OFFICE O/P EST LOW 20 MIN: CPT

## 2024-08-08 NOTE — PHYSICAL EXAM
[Coordination Grossly Intact] : coordination grossly intact [No Focal Deficits] : no focal deficits [Normal Gait] : normal gait [Normal] : affect was normal and insight and judgment were intact [de-identified] : well healing scar anterior aspect R knee

## 2024-08-08 NOTE — PHYSICAL EXAM
[Coordination Grossly Intact] : coordination grossly intact [No Focal Deficits] : no focal deficits [Normal Gait] : normal gait [Normal] : affect was normal and insight and judgment were intact [de-identified] : well healing scar anterior aspect R knee

## 2024-08-08 NOTE — HISTORY OF PRESENT ILLNESS
[FreeTextEntry1] : F/u [de-identified] : 57 yo male PMH low vit B12, insomnia, HLD presents for follow up. Patient established care and had CPE in March 2024.  Patient requesting refill of Ambien.  Patient had knee surgery in June 2024 with Dr. Pepper, tolerated procedure well. Walked 7 miles today. C/o hoarse voice. Onset 1 month ago. Denies reflux symptoms or recent congestion/cold. Otherwise feeling well today.

## 2024-08-08 NOTE — HISTORY OF PRESENT ILLNESS
[FreeTextEntry1] : F/u [de-identified] : 57 yo male PMH low vit B12, insomnia, HLD presents for follow up. Patient established care and had CPE in March 2024.  Patient requesting refill of Ambien.  Patient had knee surgery in June 2024 with Dr. Pepper, tolerated procedure well. Walked 7 miles today. C/o hoarse voice. Onset 1 month ago. Denies reflux symptoms or recent congestion/cold. Otherwise feeling well today.

## 2024-08-21 ENCOUNTER — OUTPATIENT (OUTPATIENT)
Dept: OUTPATIENT SERVICES | Facility: HOSPITAL | Age: 59
LOS: 1 days | End: 2024-08-21
Payer: COMMERCIAL

## 2024-08-21 ENCOUNTER — APPOINTMENT (OUTPATIENT)
Dept: ORTHOPEDIC SURGERY | Facility: CLINIC | Age: 59
End: 2024-08-21
Payer: COMMERCIAL

## 2024-08-21 VITALS
HEART RATE: 60 BPM | WEIGHT: 169 LBS | SYSTOLIC BLOOD PRESSURE: 130 MMHG | OXYGEN SATURATION: 99 % | BODY MASS INDEX: 23.66 KG/M2 | HEIGHT: 71 IN | DIASTOLIC BLOOD PRESSURE: 68 MMHG

## 2024-08-21 DIAGNOSIS — Z47.1 AFTERCARE FOLLOWING JOINT REPLACEMENT SURGERY: ICD-10-CM

## 2024-08-21 DIAGNOSIS — Z96.651 AFTERCARE FOLLOWING JOINT REPLACEMENT SURGERY: ICD-10-CM

## 2024-08-21 DIAGNOSIS — Z96.651 PRESENCE OF RIGHT ARTIFICIAL KNEE JOINT: ICD-10-CM

## 2024-08-21 DIAGNOSIS — M76.52 PATELLAR TENDINITIS, LEFT KNEE: ICD-10-CM

## 2024-08-21 PROCEDURE — 77073 BONE LENGTH STUDIES: CPT | Mod: 26

## 2024-08-21 PROCEDURE — 73562 X-RAY EXAM OF KNEE 3: CPT | Mod: 26,RT

## 2024-08-21 PROCEDURE — 77073 BONE LENGTH STUDIES: CPT

## 2024-08-21 PROCEDURE — 73562 X-RAY EXAM OF KNEE 3: CPT

## 2024-08-21 PROCEDURE — 99024 POSTOP FOLLOW-UP VISIT: CPT

## 2024-08-21 NOTE — HISTORY OF PRESENT ILLNESS
[de-identified] : Nessa is a 58 year old male who presents today for a post op visit.   Post op Day: 8 weeks Surgery Type: Total Knee Arthroplasty Side of Surgery: Right Date of Surgery: 06.25.24   Pain Level: 1 Assistive Device: none Satisfaction Level: Very Satisfied   Activities: Walking and regular exercises [de-identified] : Nessa is a 58 year old male who presents today 8 weeks s/p right TKA. He states he is feeling good. he walked 10 miles the other day and is happy with his progress.  [de-identified] : Right Knee Exam:  Incision: Healed and sealed midline incision.  Incision Pin Site: Healing tibial pin site incision.  Skin: No erythema. No ecchymosis. No drainage. Small scab about 0.5 cm at top of incision. It was pulled off by a friends dog a week ago and now is dry and smaller and he says it just keeps improving. No effusion.  Knee Joint Swelling: No Swelling.  Alignment: Neutral.  ROM Extension: 0 degrees. ROM Flexion: 115 degrees.  Medial Collateral Ligament Laxity: Normal laxity. Good endpoint.  Lateral Collateral Ligament Laxity: Normal laxity. Good endpoint.  Instability: No flexion or extension instability  Anterior Drawer Test: No significant translation Posterior Drawer Test: Stable with good endpoint  Motor Strength:   Quadriceps strength is 5 out of 5 Hamstring strength is 5 out of 5 Ankle dorsiflexion strength is 5 out of 5 Ankle plantarflexion strength is 5 out of 5  Sensation: Light tough sensation in the lower extremity is grossly normal. Sensory change is located lateral to incision as expected.  Pulses: Pulses are palpable at the ankle at the Dorsalis Pedis Artery. Pulses are palpable at the Posterior Tibialis Artery.  Gait: Gait improving, almost normal gait.  Assistive Devices: None.     Left Knee: mild tenderness in the mid patella tendon. [de-identified] : AP Standing View: Total Knee Replacement in good position. No signs of Loosening. No subsidence. No fracture.  Lateral View: Total Knee Replacement in good position. Fit is proper on femur and tibia. Patella has a metal backed asymmetric button patella with 3 posts in good position.  [place] View: Patellofemoral joint shows implants are centrally tracking.  Bilateral Hip to Ankle Standing View: Knee Alignment is in good overall position. Alignment is 1.5 degrees Valgus on the Right. Alignment is 1 degrees Valgus on the Left. [de-identified] : Assessment:  - Post Op 8 Weeks after Total Knee Replacement, [facility] Robotic-Assisted. - Cementless all three components. - Incision clean and dry with no drainage.  - Incision Healing Well. - Skin Edges healthy. No signs of infection. Small scab 0.5 cm at top of incision. - No calf tenderness. No signs of DVT. - Pain well controlled. Not taking narcotics. - Gait is improving. - Has been going to Physical Therapy but insurance will not approve any more this year.. - Range of Motion continues to improve. - Stable collateral ligament exam. No Flexion instability. -  X-rays today show implants in good position, well aligned, no signs of loosening. -  Patient is very satisfied with his results so far.   Plan:  - Continue to work on stretching and strengthening of operative leg with PT and daily home exercise program. - Continue use of cold therapy wrap after exercise and long walks daily, as needed. - Follow-up 1 month after date of surgery with X-rays. Bilateral Standing AP, Lateral, [place] Views. - Instructed on left knee patella tendonitis care. He will incorporate it into his program.

## 2024-08-21 NOTE — HISTORY OF PRESENT ILLNESS
[de-identified] : Nessa is a 58 year old male who presents today for a post op visit.   Post op Day: 8 weeks Surgery Type: Total Knee Arthroplasty Side of Surgery: Right Date of Surgery: 06.25.24   Pain Level: 1 Assistive Device: none Satisfaction Level: Very Satisfied   Activities: Walking and regular exercises [de-identified] : Nessa is a 58 year old male who presents today 8 weeks s/p right TKA. He states he is feeling good. he walked 10 miles the other day and is happy with his progress.  [de-identified] : Right Knee Exam:  Incision: Healed and sealed midline incision.  Incision Pin Site: Healing tibial pin site incision.  Skin: No erythema. No ecchymosis. No drainage. Small scab about 0.5 cm at top of incision. It was pulled off by a friends dog a week ago and now is dry and smaller and he says it just keeps improving. No effusion.  Knee Joint Swelling: No Swelling.  Alignment: Neutral.  ROM Extension: 0 degrees. ROM Flexion: 115 degrees.  Medial Collateral Ligament Laxity: Normal laxity. Good endpoint.  Lateral Collateral Ligament Laxity: Normal laxity. Good endpoint.  Instability: No flexion or extension instability  Anterior Drawer Test: No significant translation Posterior Drawer Test: Stable with good endpoint  Motor Strength:   Quadriceps strength is 5 out of 5 Hamstring strength is 5 out of 5 Ankle dorsiflexion strength is 5 out of 5 Ankle plantarflexion strength is 5 out of 5  Sensation: Light tough sensation in the lower extremity is grossly normal. Sensory change is located lateral to incision as expected.  Pulses: Pulses are palpable at the ankle at the Dorsalis Pedis Artery. Pulses are palpable at the Posterior Tibialis Artery.  Gait: Gait improving, almost normal gait.  Assistive Devices: None.     Left Knee: mild tenderness in the mid patella tendon. [de-identified] : AP Standing View: Total Knee Replacement in good position. No signs of Loosening. No subsidence. No fracture.  Lateral View: Total Knee Replacement in good position. Fit is proper on femur and tibia. Patella has a metal backed asymmetric button patella with 3 posts in good position.  [place] View: Patellofemoral joint shows implants are centrally tracking.  Bilateral Hip to Ankle Standing View: Knee Alignment is in good overall position. Alignment is 1.5 degrees Valgus on the Right. Alignment is 1 degrees Valgus on the Left. [de-identified] : Assessment:  - Post Op 8 Weeks after Total Knee Replacement, Chaitanya Robotic-Assisted. - Cementless all three components. - Incision clean and dry with no drainage.  - Incision Healing Well. - Skin Edges healthy. No signs of infection. Small scab 0.5 cm at top of incision. - No calf tenderness. No signs of DVT. - Pain well controlled. Not taking narcotics. - Gait is improving. - Has been going to Physical Therapy but insurance will not approve any more this year.. - Range of Motion continues to improve. - Stable collateral ligament exam. No Flexion instability. -  X-rays today show implants in good position, well aligned, no signs of loosening. -  Patient is very satisfied with his results so far.   Plan:  - Continue to work on stretching and strengthening of operative leg with PT and daily home exercise program. - Continue use of cold therapy wrap after exercise and long walks daily, as needed. - Follow-up 1 month after date of surgery with X-rays. Bilateral Standing AP, Lateral, Merchant Views. - Instructed on left knee patella tendonitis care. He will incorporate it into his program.

## 2024-09-25 ENCOUNTER — APPOINTMENT (OUTPATIENT)
Dept: ORTHOPEDIC SURGERY | Facility: CLINIC | Age: 59
End: 2024-09-25
Payer: COMMERCIAL

## 2024-09-25 ENCOUNTER — OUTPATIENT (OUTPATIENT)
Dept: OUTPATIENT SERVICES | Facility: HOSPITAL | Age: 59
LOS: 1 days | End: 2024-09-25
Payer: COMMERCIAL

## 2024-09-25 VITALS
HEART RATE: 53 BPM | OXYGEN SATURATION: 97 % | DIASTOLIC BLOOD PRESSURE: 98 MMHG | HEIGHT: 71 IN | RESPIRATION RATE: 16 BRPM | SYSTOLIC BLOOD PRESSURE: 167 MMHG

## 2024-09-25 DIAGNOSIS — M25.461 EFFUSION, RIGHT KNEE: ICD-10-CM

## 2024-09-25 PROCEDURE — 73562 X-RAY EXAM OF KNEE 3: CPT

## 2024-09-25 PROCEDURE — 99214 OFFICE O/P EST MOD 30 MIN: CPT

## 2024-09-25 PROCEDURE — 73562 X-RAY EXAM OF KNEE 3: CPT | Mod: 26,RT

## 2024-09-25 PROCEDURE — 77073 BONE LENGTH STUDIES: CPT

## 2024-09-25 PROCEDURE — 77073 BONE LENGTH STUDIES: CPT | Mod: 26

## 2024-09-25 NOTE — PHYSICAL EXAM
[de-identified] : Right Knee Exam:  Incision: Healed and sealed midline incision. Incision Pin Site: Healing tibial pin site incision.  Skin: No erythema. No ecchymosis. No drainage. fully healed.   Knee Joint Swelling: Mild effusion.  Alignment: Neutral.  ROM Extension: 0 degrees. ROM Flexion: 95 degrees.  Medial Collateral Ligament Laxity: Normal laxity. Good endpoint. Lateral Collateral Ligament Laxity: Normal laxity. Good endpoint.  Instability: No flexion or extension instability  Anterior Drawer Test: No significant translation Posterior Drawer Test: Stable with good endpoint  Motor Strength: Quadriceps strength is 5 out of 5 Hamstring strength is 5 out of 5 Ankle dorsiflexion strength is 5 out of 5 Ankle plantarflexion strength is 5 out of 5  Sensation: Light tough sensation in the lower extremity is grossly normal. Sensory change is located lateral to incision as expected.  Pulses: Pulses are palpable at the ankle at the Dorsalis Pedis Artery. Pulses are palpable at the Posterior Tibialis Artery.  Gait: Gait improving, almost normal gait.  Assistive Devices: None. [de-identified] :   Imaging:. AP Standing View: Total Knee Replacement in good position. No signs of Loosening. No subsidence. No fracture.  Lateral View: Total Knee Replacement in good position. Fit is proper on femur and tibia. Patella has a metal backed asymmetric button patella with 3 posts in good position.  [place] View: Patellofemoral joint shows implants are centrally tracking.  Bilateral Hip to Ankle Standing View: Knee Alignment is in good overall position. Alignment is 1.5 degrees Valgus on the Right. Alignment is 1 degrees Valgus on the Left.

## 2024-09-25 NOTE — DISCUSSION/SUMMARY
[de-identified] : Impression Acute change in right total knee range of motion with mild swelling over the past week and a half. Now 3 months postop. Does not appear to have been traumatic Does not appear to feel sick or look infected. X-rays appear to have no change with stable cementless total knee implants.  Plan: Will work up for infection with blood tests to be sure. CBC, CRP, ESR and CMP will be done today. Celebrex 200mg bid for now. will follow-up results with patient.

## 2024-09-25 NOTE — PHYSICAL EXAM
[de-identified] : Right Knee Exam:  Incision: Healed and sealed midline incision. Incision Pin Site: Healing tibial pin site incision.  Skin: No erythema. No ecchymosis. No drainage. fully healed.   Knee Joint Swelling: Mild effusion.  Alignment: Neutral.  ROM Extension: 0 degrees. ROM Flexion: 95 degrees.  Medial Collateral Ligament Laxity: Normal laxity. Good endpoint. Lateral Collateral Ligament Laxity: Normal laxity. Good endpoint.  Instability: No flexion or extension instability  Anterior Drawer Test: No significant translation Posterior Drawer Test: Stable with good endpoint  Motor Strength: Quadriceps strength is 5 out of 5 Hamstring strength is 5 out of 5 Ankle dorsiflexion strength is 5 out of 5 Ankle plantarflexion strength is 5 out of 5  Sensation: Light tough sensation in the lower extremity is grossly normal. Sensory change is located lateral to incision as expected.  Pulses: Pulses are palpable at the ankle at the Dorsalis Pedis Artery. Pulses are palpable at the Posterior Tibialis Artery.  Gait: Gait improving, almost normal gait.  Assistive Devices: None. [de-identified] :   Imaging:. AP Standing View: Total Knee Replacement in good position. No signs of Loosening. No subsidence. No fracture.  Lateral View: Total Knee Replacement in good position. Fit is proper on femur and tibia. Patella has a metal backed asymmetric button patella with 3 posts in good position.  [place] View: Patellofemoral joint shows implants are centrally tracking.  Bilateral Hip to Ankle Standing View: Knee Alignment is in good overall position. Alignment is 1.5 degrees Valgus on the Right. Alignment is 1 degrees Valgus on the Left.

## 2024-09-25 NOTE — DISCUSSION/SUMMARY
[de-identified] : Impression Acute change in right total knee range of motion with mild swelling over the past week and a half. Now 3 months postop. Does not appear to have been traumatic Does not appear to feel sick or look infected. X-rays appear to have no change with stable cementless total knee implants.  Plan: Will work up for infection with blood tests to be sure. CBC, CRP, ESR and CMP will be done today. Celebrex 200mg bid for now. will follow-up results with patient.

## 2024-09-25 NOTE — REASON FOR VISIT
[Follow-Up Visit] : a follow-up visit for [FreeTextEntry2] : 3 months s/p right TKA on 6.25.24 at [facility]

## 2024-09-26 LAB
ALBUMIN SERPL ELPH-MCNC: 4.4 G/DL
ALP BLD-CCNC: 105 U/L
ALT SERPL-CCNC: 17 U/L
ANION GAP SERPL CALC-SCNC: 11 MMOL/L
AST SERPL-CCNC: 25 U/L
BASOPHILS # BLD AUTO: 0.02 K/UL
BASOPHILS NFR BLD AUTO: 0.3 %
BILIRUB SERPL-MCNC: 0.4 MG/DL
BUN SERPL-MCNC: 13 MG/DL
CALCIUM SERPL-MCNC: 9.6 MG/DL
CHLORIDE SERPL-SCNC: 102 MMOL/L
CO2 SERPL-SCNC: 28 MMOL/L
CREAT SERPL-MCNC: 0.75 MG/DL
CRP SERPL-MCNC: <3 MG/L
EGFR: 104 ML/MIN/1.73M2
EOSINOPHIL # BLD AUTO: 0.08 K/UL
EOSINOPHIL NFR BLD AUTO: 1.3 %
ERYTHROCYTE [SEDIMENTATION RATE] IN BLOOD BY WESTERGREN METHOD: 23 MM/HR
GLUCOSE SERPL-MCNC: 91 MG/DL
HCT VFR BLD CALC: 38.2 %
HGB BLD-MCNC: 13 G/DL
IMM GRANULOCYTES NFR BLD AUTO: 0.2 %
LYMPHOCYTES # BLD AUTO: 2.01 K/UL
LYMPHOCYTES NFR BLD AUTO: 33.6 %
MAN DIFF?: NORMAL
MCHC RBC-ENTMCNC: 32.9 PG
MCHC RBC-ENTMCNC: 34 GM/DL
MCV RBC AUTO: 96.7 FL
MONOCYTES # BLD AUTO: 0.58 K/UL
MONOCYTES NFR BLD AUTO: 9.7 %
NEUTROPHILS # BLD AUTO: 3.29 K/UL
NEUTROPHILS NFR BLD AUTO: 54.9 %
PLATELET # BLD AUTO: 249 K/UL
POTASSIUM SERPL-SCNC: 4.4 MMOL/L
PROT SERPL-MCNC: 6.9 G/DL
RBC # BLD: 3.95 M/UL
RBC # FLD: 13.1 %
SODIUM SERPL-SCNC: 140 MMOL/L
WBC # FLD AUTO: 5.99 K/UL

## 2024-10-02 ENCOUNTER — APPOINTMENT (OUTPATIENT)
Dept: ORTHOPEDIC SURGERY | Facility: CLINIC | Age: 59
End: 2024-10-02
Payer: COMMERCIAL

## 2024-10-02 VITALS
OXYGEN SATURATION: 96 % | RESPIRATION RATE: 16 BRPM | DIASTOLIC BLOOD PRESSURE: 87 MMHG | WEIGHT: 169 LBS | BODY MASS INDEX: 23.66 KG/M2 | HEART RATE: 63 BPM | SYSTOLIC BLOOD PRESSURE: 142 MMHG | HEIGHT: 71 IN

## 2024-10-02 DIAGNOSIS — Z96.651 AFTERCARE FOLLOWING JOINT REPLACEMENT SURGERY: ICD-10-CM

## 2024-10-02 DIAGNOSIS — Z96.651 PRESENCE OF RIGHT ARTIFICIAL KNEE JOINT: ICD-10-CM

## 2024-10-02 DIAGNOSIS — Z47.1 AFTERCARE FOLLOWING JOINT REPLACEMENT SURGERY: ICD-10-CM

## 2024-10-02 PROCEDURE — 99213 OFFICE O/P EST LOW 20 MIN: CPT

## 2024-10-05 NOTE — HISTORY OF PRESENT ILLNESS
[de-identified] : Nessa is a 58 year old male who presents today for a post op visit.  Post op Day: 3 months Surgery Type: Total Knee Arthroplasty Side of Surgery: Right Date of Surgery: 06.25.24  Pain Level:  Assistive Device: none Satisfaction Level: Very Satisfied  Activities: Walking and regular exercises.   [de-identified] : Nessa is a 58-year-old male who presents today a few weeks past 3 months s/p right TKA. His last visit he had a sudden reduction in ROM and aching.  We tested his blood and his CRP and ESR and WBC came back normal. Today he says it is feeling better and the motion is coming back. He is 0-105 today.  [de-identified] : Knee incision well healed. No effusion ROM 0-105 degrees. Stable to V/V. good pulses.  No signs of infection. walkling normally. [de-identified] : 3+ months s/p right tka labs were normal on infection workup. Continue PT and working on ROM If no improvement beyound 110 degree in a month we discussed a PARMINDER.

## 2024-10-05 NOTE — HISTORY OF PRESENT ILLNESS
[de-identified] : Nessa is a 58 year old male who presents today for a post op visit.  Post op Day: 3 months Surgery Type: Total Knee Arthroplasty Side of Surgery: Right Date of Surgery: 06.25.24  Pain Level:  Assistive Device: none Satisfaction Level: Very Satisfied  Activities: Walking and regular exercises.   [de-identified] : Nessa is a 58-year-old male who presents today a few weeks past 3 months s/p right TKA. His last visit he had a sudden reduction in ROM and aching.  We tested his blood and his CRP and ESR and WBC came back normal. Today he says it is feeling better and the motion is coming back. He is 0-105 today.  [de-identified] : Knee incision well healed. No effusion ROM 0-105 degrees. Stable to V/V. good pulses.  No signs of infection. walkling normally. [de-identified] : 3+ months s/p right tka labs were normal on infection workup. Continue PT and working on ROM If no improvement beyound 110 degree in a month we discussed a PARMINDER.

## 2024-10-22 ENCOUNTER — RX RENEWAL (OUTPATIENT)
Age: 59
End: 2024-10-22

## 2024-11-07 ENCOUNTER — RX RENEWAL (OUTPATIENT)
Age: 59
End: 2024-11-07

## 2024-11-22 ENCOUNTER — NON-APPOINTMENT (OUTPATIENT)
Age: 59
End: 2024-11-22

## 2024-11-26 ENCOUNTER — APPOINTMENT (OUTPATIENT)
Dept: FAMILY MEDICINE | Facility: CLINIC | Age: 59
End: 2024-11-26
Payer: COMMERCIAL

## 2024-11-26 VITALS
BODY MASS INDEX: 24.36 KG/M2 | OXYGEN SATURATION: 98 % | SYSTOLIC BLOOD PRESSURE: 147 MMHG | DIASTOLIC BLOOD PRESSURE: 90 MMHG | WEIGHT: 174 LBS | TEMPERATURE: 98 F | HEART RATE: 76 BPM | HEIGHT: 71 IN

## 2024-11-26 DIAGNOSIS — G47.00 INSOMNIA, UNSPECIFIED: ICD-10-CM

## 2024-11-26 PROCEDURE — G2211 COMPLEX E/M VISIT ADD ON: CPT | Mod: NC

## 2024-11-26 PROCEDURE — 99213 OFFICE O/P EST LOW 20 MIN: CPT

## 2024-12-02 ENCOUNTER — APPOINTMENT (OUTPATIENT)
Dept: ORTHOPEDIC SURGERY | Facility: CLINIC | Age: 59
End: 2024-12-02
Payer: COMMERCIAL

## 2024-12-02 VITALS
HEIGHT: 71 IN | BODY MASS INDEX: 24.36 KG/M2 | SYSTOLIC BLOOD PRESSURE: 148 MMHG | OXYGEN SATURATION: 99 % | HEART RATE: 59 BPM | WEIGHT: 174 LBS | DIASTOLIC BLOOD PRESSURE: 90 MMHG

## 2024-12-02 DIAGNOSIS — Z47.1 AFTERCARE FOLLOWING JOINT REPLACEMENT SURGERY: ICD-10-CM

## 2024-12-02 DIAGNOSIS — Z96.651 PRESENCE OF RIGHT ARTIFICIAL KNEE JOINT: ICD-10-CM

## 2024-12-02 DIAGNOSIS — Z96.651 AFTERCARE FOLLOWING JOINT REPLACEMENT SURGERY: ICD-10-CM

## 2024-12-02 PROCEDURE — 99212 OFFICE O/P EST SF 10 MIN: CPT

## 2025-01-02 ENCOUNTER — APPOINTMENT (OUTPATIENT)
Dept: FAMILY MEDICINE | Facility: CLINIC | Age: 60
End: 2025-01-02
Payer: COMMERCIAL

## 2025-01-02 DIAGNOSIS — J06.9 ACUTE UPPER RESPIRATORY INFECTION, UNSPECIFIED: ICD-10-CM

## 2025-01-02 PROCEDURE — 99213 OFFICE O/P EST LOW 20 MIN: CPT

## 2025-01-08 ENCOUNTER — APPOINTMENT (OUTPATIENT)
Dept: FAMILY MEDICINE | Facility: CLINIC | Age: 60
End: 2025-01-08
Payer: COMMERCIAL

## 2025-01-08 ENCOUNTER — APPOINTMENT (OUTPATIENT)
Dept: FAMILY MEDICINE | Facility: CLINIC | Age: 60
End: 2025-01-08

## 2025-01-08 PROCEDURE — 99213 OFFICE O/P EST LOW 20 MIN: CPT

## 2025-01-10 ENCOUNTER — APPOINTMENT (OUTPATIENT)
Dept: FAMILY MEDICINE | Facility: CLINIC | Age: 60
End: 2025-01-10

## 2025-01-10 ENCOUNTER — RX RENEWAL (OUTPATIENT)
Age: 60
End: 2025-01-10

## 2025-01-10 DIAGNOSIS — T78.40XA ALLERGY, UNSPECIFIED, INITIAL ENCOUNTER: ICD-10-CM

## 2025-01-10 PROBLEM — J32.9 SINUS INFECTION: Status: ACTIVE | Noted: 2025-01-08

## 2025-01-10 PROCEDURE — 99214 OFFICE O/P EST MOD 30 MIN: CPT | Mod: 95

## 2025-01-10 RX ORDER — DOXYCYCLINE HYCLATE 100 MG/1
100 TABLET ORAL
Qty: 14 | Refills: 0 | Status: ACTIVE | COMMUNITY
Start: 2025-01-10 | End: 1900-01-01

## 2025-01-15 ENCOUNTER — OUTPATIENT (OUTPATIENT)
Dept: OUTPATIENT SERVICES | Facility: HOSPITAL | Age: 60
LOS: 1 days | End: 2025-01-15
Payer: COMMERCIAL

## 2025-01-15 ENCOUNTER — APPOINTMENT (OUTPATIENT)
Dept: ORTHOPEDIC SURGERY | Facility: CLINIC | Age: 60
End: 2025-01-15
Payer: COMMERCIAL

## 2025-01-15 VITALS
SYSTOLIC BLOOD PRESSURE: 147 MMHG | HEART RATE: 69 BPM | BODY MASS INDEX: 24.36 KG/M2 | OXYGEN SATURATION: 97 % | HEIGHT: 71 IN | WEIGHT: 174 LBS | DIASTOLIC BLOOD PRESSURE: 93 MMHG

## 2025-01-15 DIAGNOSIS — Z96.651 PRESENCE OF RIGHT ARTIFICIAL KNEE JOINT: ICD-10-CM

## 2025-01-15 DIAGNOSIS — Z47.1 AFTERCARE FOLLOWING JOINT REPLACEMENT SURGERY: ICD-10-CM

## 2025-01-15 DIAGNOSIS — Z96.651 AFTERCARE FOLLOWING JOINT REPLACEMENT SURGERY: ICD-10-CM

## 2025-01-15 DIAGNOSIS — T84.82XD FIBROSIS DUE TO INTERNAL ORTHOPEDIC PROSTHETIC DEVICES, IMPLANTS AND GRAFTS, SUBSEQUENT ENCOUNTER: ICD-10-CM

## 2025-01-15 DIAGNOSIS — M24.661 ANKYLOSIS, RIGHT KNEE: ICD-10-CM

## 2025-01-15 DIAGNOSIS — M25.661 STIFFNESS OF RIGHT KNEE, NOT ELSEWHERE CLASSIFIED: ICD-10-CM

## 2025-01-15 PROCEDURE — 77073 BONE LENGTH STUDIES: CPT | Mod: 26

## 2025-01-15 PROCEDURE — 99214 OFFICE O/P EST MOD 30 MIN: CPT

## 2025-01-15 PROCEDURE — 73562 X-RAY EXAM OF KNEE 3: CPT

## 2025-01-15 PROCEDURE — 77073 BONE LENGTH STUDIES: CPT

## 2025-01-15 PROCEDURE — 73562 X-RAY EXAM OF KNEE 3: CPT | Mod: 26,RT

## 2025-01-17 ENCOUNTER — APPOINTMENT (OUTPATIENT)
Dept: INTERNAL MEDICINE | Facility: CLINIC | Age: 60
End: 2025-01-17
Payer: COMMERCIAL

## 2025-01-17 ENCOUNTER — NON-APPOINTMENT (OUTPATIENT)
Age: 60
End: 2025-01-17

## 2025-01-17 VITALS
HEIGHT: 71 IN | OXYGEN SATURATION: 99 % | SYSTOLIC BLOOD PRESSURE: 154 MMHG | RESPIRATION RATE: 16 BRPM | TEMPERATURE: 98.1 F | HEART RATE: 60 BPM | DIASTOLIC BLOOD PRESSURE: 84 MMHG | WEIGHT: 159 LBS | BODY MASS INDEX: 22.26 KG/M2

## 2025-01-17 DIAGNOSIS — J32.9 CHRONIC SINUSITIS, UNSPECIFIED: ICD-10-CM

## 2025-01-17 DIAGNOSIS — Z01.818 ENCOUNTER FOR OTHER PREPROCEDURAL EXAMINATION: ICD-10-CM

## 2025-01-17 DIAGNOSIS — G89.29 PAIN IN RIGHT KNEE: ICD-10-CM

## 2025-01-17 DIAGNOSIS — M25.561 PAIN IN RIGHT KNEE: ICD-10-CM

## 2025-01-17 PROCEDURE — 99214 OFFICE O/P EST MOD 30 MIN: CPT

## 2025-01-17 PROCEDURE — 93000 ELECTROCARDIOGRAM COMPLETE: CPT

## 2025-01-17 RX ORDER — AMOXICILLIN AND CLAVULANATE POTASSIUM 875; 125 MG/1; MG/1
875-125 TABLET, COATED ORAL TWICE DAILY
Qty: 20 | Refills: 0 | Status: DISCONTINUED | COMMUNITY
Start: 2025-01-08 | End: 2025-01-17

## 2025-01-27 ENCOUNTER — APPOINTMENT (OUTPATIENT)
Dept: FAMILY MEDICINE | Facility: CLINIC | Age: 60
End: 2025-01-27
Payer: COMMERCIAL

## 2025-01-27 VITALS
HEART RATE: 70 BPM | HEIGHT: 71 IN | DIASTOLIC BLOOD PRESSURE: 83 MMHG | OXYGEN SATURATION: 98 % | TEMPERATURE: 97.1 F | WEIGHT: 162 LBS | SYSTOLIC BLOOD PRESSURE: 146 MMHG | BODY MASS INDEX: 22.68 KG/M2

## 2025-01-27 VITALS — DIASTOLIC BLOOD PRESSURE: 80 MMHG | SYSTOLIC BLOOD PRESSURE: 138 MMHG

## 2025-01-27 DIAGNOSIS — Z13.29 ENCOUNTER FOR SCREENING FOR OTHER SUSPECTED ENDOCRINE DISORDER: ICD-10-CM

## 2025-01-27 DIAGNOSIS — Z00.00 ENCOUNTER FOR GENERAL ADULT MEDICAL EXAMINATION W/OUT ABNORMAL FINDINGS: ICD-10-CM

## 2025-01-27 DIAGNOSIS — Z13.0 ENCOUNTER FOR SCREENING FOR DISEASES OF THE BLOOD AND BLOOD-FORMING ORGANS AND CERTAIN DISORDERS INVOLVING THE IMMUNE MECHANISM: ICD-10-CM

## 2025-01-27 DIAGNOSIS — Z13.220 ENCOUNTER FOR SCREENING FOR LIPOID DISORDERS: ICD-10-CM

## 2025-01-27 DIAGNOSIS — Z12.5 ENCOUNTER FOR SCREENING FOR MALIGNANT NEOPLASM OF PROSTATE: ICD-10-CM

## 2025-01-27 DIAGNOSIS — G47.00 INSOMNIA, UNSPECIFIED: ICD-10-CM

## 2025-01-27 DIAGNOSIS — R79.89 OTHER SPECIFIED ABNORMAL FINDINGS OF BLOOD CHEMISTRY: ICD-10-CM

## 2025-01-27 DIAGNOSIS — R20.0 ANESTHESIA OF SKIN: ICD-10-CM

## 2025-01-27 DIAGNOSIS — Z13.0 ENCOUNTER FOR SCREENING FOR OTHER SUSPECTED ENDOCRINE DISORDER: ICD-10-CM

## 2025-01-27 DIAGNOSIS — R20.2 ANESTHESIA OF SKIN: ICD-10-CM

## 2025-01-27 DIAGNOSIS — Z13.228 ENCOUNTER FOR SCREENING FOR OTHER SUSPECTED ENDOCRINE DISORDER: ICD-10-CM

## 2025-01-27 PROCEDURE — 36415 COLL VENOUS BLD VENIPUNCTURE: CPT

## 2025-01-27 PROCEDURE — 99396 PREV VISIT EST AGE 40-64: CPT | Mod: 25

## 2025-01-27 RX ORDER — CELECOXIB 200 MG/1
200 CAPSULE ORAL
Qty: 28 | Refills: 1 | Status: ACTIVE | COMMUNITY
Start: 2025-01-27 | End: 1900-01-01

## 2025-01-27 RX ORDER — HYDROCODONE BITARTRATE AND ACETAMINOPHEN 5; 325 MG/1; MG/1
5-325 TABLET ORAL
Qty: 18 | Refills: 0 | Status: ACTIVE | COMMUNITY
Start: 2025-01-27 | End: 1900-01-01

## 2025-01-28 ENCOUNTER — APPOINTMENT (OUTPATIENT)
Age: 60
End: 2025-01-28

## 2025-01-28 PROCEDURE — 27570 FIXATION OF KNEE JOINT: CPT | Mod: RT

## 2025-01-29 ENCOUNTER — APPOINTMENT (OUTPATIENT)
Dept: FAMILY MEDICINE | Facility: CLINIC | Age: 60
End: 2025-01-29

## 2025-01-29 LAB
ALBUMIN SERPL ELPH-MCNC: 4.5 G/DL
ALP BLD-CCNC: 97 U/L
ALT SERPL-CCNC: 14 U/L
ANION GAP SERPL CALC-SCNC: 15 MMOL/L
AST SERPL-CCNC: 23 U/L
BASOPHILS # BLD AUTO: 0.02 K/UL
BASOPHILS NFR BLD AUTO: 0.5 %
BILIRUB SERPL-MCNC: 0.7 MG/DL
BUN SERPL-MCNC: 14 MG/DL
CALCIUM SERPL-MCNC: 9.7 MG/DL
CHLORIDE SERPL-SCNC: 101 MMOL/L
CHOLEST SERPL-MCNC: 216 MG/DL
CO2 SERPL-SCNC: 24 MMOL/L
CREAT SERPL-MCNC: 0.86 MG/DL
EGFR: 100 ML/MIN/1.73M2
EOSINOPHIL # BLD AUTO: 0.06 K/UL
EOSINOPHIL NFR BLD AUTO: 1.6 %
ESTIMATED AVERAGE GLUCOSE: 100 MG/DL
FOLATE SERPL-MCNC: 5.8 NG/ML
GLUCOSE SERPL-MCNC: 82 MG/DL
HBA1C MFR BLD HPLC: 5.1 %
HCT VFR BLD CALC: 41.4 %
HDLC SERPL-MCNC: 81 MG/DL
HGB BLD-MCNC: 14 G/DL
IMM GRANULOCYTES NFR BLD AUTO: 0.3 %
LDLC SERPL CALC-MCNC: 124 MG/DL
LYMPHOCYTES # BLD AUTO: 1.62 K/UL
LYMPHOCYTES NFR BLD AUTO: 43.1 %
MAN DIFF?: NORMAL
MCHC RBC-ENTMCNC: 33.7 PG
MCHC RBC-ENTMCNC: 33.8 G/DL
MCV RBC AUTO: 99.5 FL
MONOCYTES # BLD AUTO: 0.43 K/UL
MONOCYTES NFR BLD AUTO: 11.4 %
NEUTROPHILS # BLD AUTO: 1.62 K/UL
NEUTROPHILS NFR BLD AUTO: 43.1 %
NONHDLC SERPL-MCNC: 135 MG/DL
PLATELET # BLD AUTO: 252 K/UL
POTASSIUM SERPL-SCNC: 4.2 MMOL/L
PROT SERPL-MCNC: 7 G/DL
PSA SERPL-MCNC: 0.34 NG/ML
RBC # BLD: 4.16 M/UL
RBC # FLD: 13.1 %
SODIUM SERPL-SCNC: 140 MMOL/L
TRIGL SERPL-MCNC: 62 MG/DL
VIT B12 SERPL-MCNC: >2000 PG/ML
WBC # FLD AUTO: 3.76 K/UL

## 2025-02-20 DIAGNOSIS — D72.819 DECREASED WHITE BLOOD CELL COUNT, UNSPECIFIED: ICD-10-CM

## 2025-02-25 ENCOUNTER — APPOINTMENT (OUTPATIENT)
Dept: FAMILY MEDICINE | Facility: CLINIC | Age: 60
End: 2025-02-25

## 2025-03-01 LAB
BASOPHILS # BLD AUTO: 0.03 K/UL
BASOPHILS NFR BLD AUTO: 0.6 %
EOSINOPHIL # BLD AUTO: 0.09 K/UL
EOSINOPHIL NFR BLD AUTO: 1.9 %
HCT VFR BLD CALC: 40.7 %
HGB BLD-MCNC: 14 G/DL
IMM GRANULOCYTES NFR BLD AUTO: 0.2 %
LYMPHOCYTES # BLD AUTO: 1.9 K/UL
LYMPHOCYTES NFR BLD AUTO: 40.5 %
MAN DIFF?: NORMAL
MCHC RBC-ENTMCNC: 33.9 PG
MCHC RBC-ENTMCNC: 34.4 G/DL
MCV RBC AUTO: 98.5 FL
MONOCYTES # BLD AUTO: 0.58 K/UL
MONOCYTES NFR BLD AUTO: 12.4 %
NEUTROPHILS # BLD AUTO: 2.08 K/UL
NEUTROPHILS NFR BLD AUTO: 44.4 %
PLATELET # BLD AUTO: 255 K/UL
RBC # BLD: 4.13 M/UL
RBC # FLD: 12.6 %
WBC # FLD AUTO: 4.69 K/UL

## 2025-03-05 ENCOUNTER — APPOINTMENT (OUTPATIENT)
Dept: ORTHOPEDIC SURGERY | Facility: CLINIC | Age: 60
End: 2025-03-05
Payer: COMMERCIAL

## 2025-03-05 VITALS
HEART RATE: 68 BPM | BODY MASS INDEX: 22.68 KG/M2 | SYSTOLIC BLOOD PRESSURE: 148 MMHG | OXYGEN SATURATION: 99 % | HEIGHT: 71 IN | WEIGHT: 162 LBS | DIASTOLIC BLOOD PRESSURE: 92 MMHG

## 2025-03-05 DIAGNOSIS — M25.661 STIFFNESS OF RIGHT KNEE, NOT ELSEWHERE CLASSIFIED: ICD-10-CM

## 2025-03-05 DIAGNOSIS — Z96.651 AFTERCARE FOLLOWING JOINT REPLACEMENT SURGERY: ICD-10-CM

## 2025-03-05 DIAGNOSIS — Z96.651 PRESENCE OF RIGHT ARTIFICIAL KNEE JOINT: ICD-10-CM

## 2025-03-05 DIAGNOSIS — Z47.1 AFTERCARE FOLLOWING JOINT REPLACEMENT SURGERY: ICD-10-CM

## 2025-03-05 DIAGNOSIS — M24.661 ANKYLOSIS, RIGHT KNEE: ICD-10-CM

## 2025-03-05 PROCEDURE — 99214 OFFICE O/P EST MOD 30 MIN: CPT

## 2025-04-21 ENCOUNTER — NON-APPOINTMENT (OUTPATIENT)
Age: 60
End: 2025-04-21

## 2025-05-05 DIAGNOSIS — Z79.2 LONG TERM (CURRENT) USE OF ANTIBIOTICS: ICD-10-CM

## 2025-05-05 RX ORDER — CLINDAMYCIN HYDROCHLORIDE 300 MG/1
300 CAPSULE ORAL
Qty: 20 | Refills: 1 | Status: ACTIVE | COMMUNITY
Start: 2025-05-05 | End: 1900-01-01

## 2025-05-12 ENCOUNTER — TRANSCRIPTION ENCOUNTER (OUTPATIENT)
Age: 60
End: 2025-05-12

## 2025-05-29 ENCOUNTER — NON-APPOINTMENT (OUTPATIENT)
Age: 60
End: 2025-05-29

## 2025-05-30 ENCOUNTER — APPOINTMENT (OUTPATIENT)
Dept: ORTHOPEDIC SURGERY | Facility: CLINIC | Age: 60
End: 2025-05-30

## 2025-05-30 VITALS — RESPIRATION RATE: 18 BRPM | WEIGHT: 162 LBS | BODY MASS INDEX: 22.68 KG/M2 | HEIGHT: 71 IN

## 2025-05-30 DIAGNOSIS — M17.0 BILATERAL PRIMARY OSTEOARTHRITIS OF KNEE: ICD-10-CM

## 2025-05-30 PROCEDURE — 20610 DRAIN/INJ JOINT/BURSA W/O US: CPT | Mod: LT

## 2025-06-11 ENCOUNTER — APPOINTMENT (OUTPATIENT)
Dept: ORTHOPEDIC SURGERY | Facility: CLINIC | Age: 60
End: 2025-06-11

## 2025-06-11 ENCOUNTER — APPOINTMENT (OUTPATIENT)
Dept: RADIOLOGY | Facility: CLINIC | Age: 60
End: 2025-06-11

## 2025-06-11 ENCOUNTER — OUTPATIENT (OUTPATIENT)
Dept: OUTPATIENT SERVICES | Facility: HOSPITAL | Age: 60
LOS: 1 days | End: 2025-06-11

## 2025-06-11 VITALS
DIASTOLIC BLOOD PRESSURE: 88 MMHG | HEIGHT: 71 IN | BODY MASS INDEX: 22.68 KG/M2 | WEIGHT: 162 LBS | OXYGEN SATURATION: 97 % | SYSTOLIC BLOOD PRESSURE: 157 MMHG | HEART RATE: 69 BPM

## 2025-06-11 PROCEDURE — 73564 X-RAY EXAM KNEE 4 OR MORE: CPT | Mod: 26,50,59

## 2025-06-11 PROCEDURE — 77073 BONE LENGTH STUDIES: CPT | Mod: 26

## 2025-06-11 PROCEDURE — 99213 OFFICE O/P EST LOW 20 MIN: CPT

## 2025-06-30 ENCOUNTER — APPOINTMENT (OUTPATIENT)
Dept: ORTHOPEDIC SURGERY | Facility: CLINIC | Age: 60
End: 2025-06-30
Payer: COMMERCIAL

## 2025-06-30 VITALS — BODY MASS INDEX: 22.68 KG/M2 | HEIGHT: 71 IN | RESPIRATION RATE: 18 BRPM | WEIGHT: 162 LBS

## 2025-06-30 PROBLEM — Z96.651 STATUS POST TOTAL RIGHT KNEE REPLACEMENT: Status: ACTIVE | Noted: 2025-06-30

## 2025-06-30 PROCEDURE — 99214 OFFICE O/P EST MOD 30 MIN: CPT

## 2025-06-30 RX ORDER — CELECOXIB 200 MG/1
200 CAPSULE ORAL
Qty: 90 | Refills: 2 | Status: ACTIVE | COMMUNITY
Start: 2025-06-30 | End: 1900-01-01

## 2025-07-03 ENCOUNTER — NON-APPOINTMENT (OUTPATIENT)
Age: 60
End: 2025-07-03

## 2025-07-24 ENCOUNTER — APPOINTMENT (OUTPATIENT)
Dept: AFTER HOURS CARE | Facility: EMERGENCY ROOM | Age: 60
End: 2025-07-24
Payer: COMMERCIAL

## 2025-07-24 DIAGNOSIS — R39.9 UNSPECIFIED SYMPTOMS AND SIGNS INVOLVING THE GENITOURINARY SYSTEM: ICD-10-CM

## 2025-07-24 PROCEDURE — 99204 OFFICE O/P NEW MOD 45 MIN: CPT | Mod: 95

## 2025-07-24 RX ORDER — CEFUROXIME AXETIL 500 MG/1
500 TABLET, FILM COATED ORAL
Qty: 20 | Refills: 0 | Status: ACTIVE | COMMUNITY
Start: 2025-07-24 | End: 1900-01-01

## 2025-07-24 RX ORDER — SACCHAROMYCES BOULARDII 50 MG
250 CAPSULE ORAL TWICE DAILY
Qty: 20 | Refills: 0 | Status: ACTIVE | COMMUNITY
Start: 2025-07-24 | End: 1900-01-01

## 2025-08-01 ENCOUNTER — NON-APPOINTMENT (OUTPATIENT)
Age: 60
End: 2025-08-01

## 2025-08-29 ENCOUNTER — TRANSCRIPTION ENCOUNTER (OUTPATIENT)
Age: 60
End: 2025-08-29

## 2025-08-29 ENCOUNTER — APPOINTMENT (OUTPATIENT)
Dept: ORTHOPEDIC SURGERY | Facility: CLINIC | Age: 60
End: 2025-08-29
Payer: COMMERCIAL

## 2025-08-29 VITALS — BODY MASS INDEX: 22.68 KG/M2 | RESPIRATION RATE: 18 BRPM | WEIGHT: 162 LBS | HEIGHT: 71 IN

## 2025-08-29 DIAGNOSIS — R11.0 NAUSEA: ICD-10-CM

## 2025-08-29 DIAGNOSIS — M17.0 BILATERAL PRIMARY OSTEOARTHRITIS OF KNEE: ICD-10-CM

## 2025-08-29 PROCEDURE — 20610 DRAIN/INJ JOINT/BURSA W/O US: CPT | Mod: LT

## 2025-08-29 RX ORDER — ONDANSETRON 4 MG/1
4 TABLET, ORALLY DISINTEGRATING ORAL
Qty: 16 | Refills: 0 | Status: ACTIVE | COMMUNITY
Start: 2025-08-29 | End: 1900-01-01

## 2025-08-29 RX ORDER — CELECOXIB 200 MG/1
200 CAPSULE ORAL
Qty: 90 | Refills: 0 | Status: ACTIVE | COMMUNITY
Start: 2025-08-29 | End: 1900-01-01

## 2025-09-10 ENCOUNTER — APPOINTMENT (OUTPATIENT)
Dept: RADIOLOGY | Facility: CLINIC | Age: 60
End: 2025-09-10

## 2025-09-10 ENCOUNTER — APPOINTMENT (OUTPATIENT)
Dept: ORTHOPEDIC SURGERY | Facility: CLINIC | Age: 60
End: 2025-09-10
Payer: COMMERCIAL

## 2025-09-10 VITALS
OXYGEN SATURATION: 99 % | HEIGHT: 71 IN | BODY MASS INDEX: 22.68 KG/M2 | WEIGHT: 162 LBS | HEART RATE: 77 BPM | DIASTOLIC BLOOD PRESSURE: 90 MMHG | SYSTOLIC BLOOD PRESSURE: 160 MMHG

## 2025-09-10 DIAGNOSIS — Z47.1 AFTERCARE FOLLOWING JOINT REPLACEMENT SURGERY: ICD-10-CM

## 2025-09-10 DIAGNOSIS — Z96.651 PRESENCE OF RIGHT ARTIFICIAL KNEE JOINT: ICD-10-CM

## 2025-09-10 DIAGNOSIS — Z96.651 AFTERCARE FOLLOWING JOINT REPLACEMENT SURGERY: ICD-10-CM

## 2025-09-10 PROCEDURE — 99213 OFFICE O/P EST LOW 20 MIN: CPT
